# Patient Record
Sex: FEMALE | Race: BLACK OR AFRICAN AMERICAN | NOT HISPANIC OR LATINO | Employment: OTHER | URBAN - METROPOLITAN AREA
[De-identification: names, ages, dates, MRNs, and addresses within clinical notes are randomized per-mention and may not be internally consistent; named-entity substitution may affect disease eponyms.]

---

## 2017-08-17 ENCOUNTER — APPOINTMENT (OUTPATIENT)
Dept: WOMENS IMAGING | Facility: HOSPITAL | Age: 62
End: 2017-08-17

## 2017-08-17 PROCEDURE — 77063 BREAST TOMOSYNTHESIS BI: CPT | Performed by: RADIOLOGY

## 2017-08-17 PROCEDURE — 77067 SCR MAMMO BI INCL CAD: CPT | Performed by: RADIOLOGY

## 2017-08-31 ENCOUNTER — APPOINTMENT (OUTPATIENT)
Dept: WOMENS IMAGING | Facility: HOSPITAL | Age: 62
End: 2017-08-31

## 2017-08-31 PROCEDURE — G0206 DX MAMMO INCL CAD UNI: HCPCS | Performed by: RADIOLOGY

## 2017-08-31 PROCEDURE — 77061 BREAST TOMOSYNTHESIS UNI: CPT | Performed by: RADIOLOGY

## 2017-08-31 PROCEDURE — G0279 TOMOSYNTHESIS, MAMMO: HCPCS | Performed by: RADIOLOGY

## 2017-08-31 PROCEDURE — 77065 DX MAMMO INCL CAD UNI: CPT | Performed by: RADIOLOGY

## 2017-08-31 PROCEDURE — 76641 ULTRASOUND BREAST COMPLETE: CPT | Performed by: RADIOLOGY

## 2018-08-30 ENCOUNTER — APPOINTMENT (OUTPATIENT)
Dept: WOMENS IMAGING | Facility: HOSPITAL | Age: 63
End: 2018-08-30

## 2018-08-30 PROCEDURE — 77063 BREAST TOMOSYNTHESIS BI: CPT | Performed by: RADIOLOGY

## 2018-08-30 PROCEDURE — 77067 SCR MAMMO BI INCL CAD: CPT | Performed by: RADIOLOGY

## 2019-09-05 ENCOUNTER — APPOINTMENT (OUTPATIENT)
Dept: WOMENS IMAGING | Facility: HOSPITAL | Age: 64
End: 2019-09-05

## 2019-09-05 PROCEDURE — 77067 SCR MAMMO BI INCL CAD: CPT | Performed by: RADIOLOGY

## 2019-09-05 PROCEDURE — 77063 BREAST TOMOSYNTHESIS BI: CPT | Performed by: RADIOLOGY

## 2019-09-24 ENCOUNTER — APPOINTMENT (OUTPATIENT)
Dept: GENERAL RADIOLOGY | Facility: HOSPITAL | Age: 64
End: 2019-09-24

## 2019-09-24 ENCOUNTER — HOSPITAL ENCOUNTER (EMERGENCY)
Facility: HOSPITAL | Age: 64
Discharge: HOME | End: 2019-09-24
Attending: EMERGENCY MEDICINE | Admitting: EMERGENCY MEDICINE

## 2019-09-24 VITALS
RESPIRATION RATE: 18 BRPM | HEIGHT: 62 IN | TEMPERATURE: 97.4 F | DIASTOLIC BLOOD PRESSURE: 78 MMHG | HEART RATE: 65 BPM | BODY MASS INDEX: 28.34 KG/M2 | OXYGEN SATURATION: 99 % | WEIGHT: 154 LBS | SYSTOLIC BLOOD PRESSURE: 134 MMHG

## 2019-09-24 DIAGNOSIS — R07.89 ATYPICAL CHEST PAIN: Primary | ICD-10-CM

## 2019-09-24 LAB
ALBUMIN SERPL-MCNC: 4.6 G/DL (ref 3.5–5.2)
ALBUMIN/GLOB SERPL: 1.6 G/DL
ALP SERPL-CCNC: 66 U/L (ref 39–117)
ALT SERPL W P-5'-P-CCNC: 17 U/L (ref 1–33)
ANION GAP SERPL CALCULATED.3IONS-SCNC: 12.9 MMOL/L (ref 5–15)
AST SERPL-CCNC: 19 U/L (ref 1–32)
BASOPHILS # BLD AUTO: 0.07 10*3/MM3 (ref 0–0.2)
BASOPHILS NFR BLD AUTO: 0.9 % (ref 0–1.5)
BILIRUB SERPL-MCNC: 0.4 MG/DL (ref 0.2–1.2)
BUN BLD-MCNC: 12 MG/DL (ref 8–23)
BUN/CREAT SERPL: 12.5 (ref 7–25)
CALCIUM SPEC-SCNC: 9.5 MG/DL (ref 8.6–10.5)
CHLORIDE SERPL-SCNC: 98 MMOL/L (ref 98–107)
CO2 SERPL-SCNC: 25.1 MMOL/L (ref 22–29)
CREAT BLD-MCNC: 0.96 MG/DL (ref 0.57–1)
D DIMER PPP FEU-MCNC: <0.27 MCGFEU/ML (ref 0–0.49)
DEPRECATED RDW RBC AUTO: 46.3 FL (ref 37–54)
EOSINOPHIL # BLD AUTO: 0.16 10*3/MM3 (ref 0–0.4)
EOSINOPHIL NFR BLD AUTO: 2 % (ref 0.3–6.2)
ERYTHROCYTE [DISTWIDTH] IN BLOOD BY AUTOMATED COUNT: 14.6 % (ref 12.3–15.4)
GFR SERPL CREATININE-BSD FRML MDRD: 71 ML/MIN/1.73
GLOBULIN UR ELPH-MCNC: 2.8 GM/DL
GLUCOSE BLD-MCNC: 166 MG/DL (ref 65–99)
HCT VFR BLD AUTO: 37.2 % (ref 34–46.6)
HGB BLD-MCNC: 12.1 G/DL (ref 12–15.9)
IMM GRANULOCYTES # BLD AUTO: 0.03 10*3/MM3 (ref 0–0.05)
IMM GRANULOCYTES NFR BLD AUTO: 0.4 % (ref 0–0.5)
LIPASE SERPL-CCNC: 13 U/L (ref 13–60)
LYMPHOCYTES # BLD AUTO: 2.47 10*3/MM3 (ref 0.7–3.1)
LYMPHOCYTES NFR BLD AUTO: 30.5 % (ref 19.6–45.3)
MCH RBC QN AUTO: 28.5 PG (ref 26.6–33)
MCHC RBC AUTO-ENTMCNC: 32.5 G/DL (ref 31.5–35.7)
MCV RBC AUTO: 87.7 FL (ref 79–97)
MONOCYTES # BLD AUTO: 0.91 10*3/MM3 (ref 0.1–0.9)
MONOCYTES NFR BLD AUTO: 11.2 % (ref 5–12)
NEUTROPHILS # BLD AUTO: 4.46 10*3/MM3 (ref 1.7–7)
NEUTROPHILS NFR BLD AUTO: 55 % (ref 42.7–76)
NRBC BLD AUTO-RTO: 0 /100 WBC (ref 0–0.2)
PLATELET # BLD AUTO: 407 10*3/MM3 (ref 140–450)
PMV BLD AUTO: 10.3 FL (ref 6–12)
POTASSIUM BLD-SCNC: 3.7 MMOL/L (ref 3.5–5.2)
PROT SERPL-MCNC: 7.4 G/DL (ref 6–8.5)
RBC # BLD AUTO: 4.24 10*6/MM3 (ref 3.77–5.28)
SODIUM BLD-SCNC: 136 MMOL/L (ref 136–145)
TROPONIN T SERPL-MCNC: <0.01 NG/ML (ref 0–0.03)
TROPONIN T SERPL-MCNC: <0.01 NG/ML (ref 0–0.03)
WBC NRBC COR # BLD: 8.1 10*3/MM3 (ref 3.4–10.8)

## 2019-09-24 PROCEDURE — 84484 ASSAY OF TROPONIN QUANT: CPT | Performed by: EMERGENCY MEDICINE

## 2019-09-24 PROCEDURE — 99284 EMERGENCY DEPT VISIT MOD MDM: CPT

## 2019-09-24 PROCEDURE — 93005 ELECTROCARDIOGRAM TRACING: CPT

## 2019-09-24 PROCEDURE — 85379 FIBRIN DEGRADATION QUANT: CPT | Performed by: EMERGENCY MEDICINE

## 2019-09-24 PROCEDURE — 93005 ELECTROCARDIOGRAM TRACING: CPT | Performed by: EMERGENCY MEDICINE

## 2019-09-24 PROCEDURE — 80053 COMPREHEN METABOLIC PANEL: CPT | Performed by: EMERGENCY MEDICINE

## 2019-09-24 PROCEDURE — 93010 ELECTROCARDIOGRAM REPORT: CPT | Performed by: INTERNAL MEDICINE

## 2019-09-24 PROCEDURE — 83690 ASSAY OF LIPASE: CPT | Performed by: EMERGENCY MEDICINE

## 2019-09-24 PROCEDURE — 71046 X-RAY EXAM CHEST 2 VIEWS: CPT

## 2019-09-24 PROCEDURE — 85025 COMPLETE CBC W/AUTO DIFF WBC: CPT | Performed by: EMERGENCY MEDICINE

## 2019-09-24 RX ORDER — ROSUVASTATIN CALCIUM 10 MG/1
5 TABLET, COATED ORAL DAILY
COMMUNITY
End: 2019-09-24

## 2019-09-24 RX ORDER — FAMOTIDINE 20 MG/1
20 TABLET, FILM COATED ORAL 2 TIMES DAILY
Qty: 20 TABLET | Refills: 0 | Status: SHIPPED | OUTPATIENT
Start: 2019-09-24 | End: 2023-03-10

## 2019-09-24 RX ORDER — LOSARTAN POTASSIUM 100 MG/1
100 TABLET ORAL DAILY
COMMUNITY
Start: 2018-12-29

## 2019-09-24 RX ORDER — LEVOTHYROXINE SODIUM 0.07 MG/1
75 TABLET ORAL DAILY
COMMUNITY

## 2019-09-24 RX ORDER — ATORVASTATIN CALCIUM 20 MG/1
20 TABLET, FILM COATED ORAL DAILY
COMMUNITY

## 2019-09-24 RX ORDER — MELOXICAM 15 MG/1
15 TABLET ORAL DAILY
COMMUNITY
Start: 2014-03-24

## 2019-09-24 RX ORDER — SODIUM CHLORIDE 0.9 % (FLUSH) 0.9 %
10 SYRINGE (ML) INJECTION AS NEEDED
Status: DISCONTINUED | OUTPATIENT
Start: 2019-09-24 | End: 2019-09-24 | Stop reason: HOSPADM

## 2019-09-24 NOTE — ED NOTES
Pt arrives with c/o CP that started yesterday. Denies SOA. Pt appears in NAD.       Alexa Kessler RN  09/24/19 4970

## 2019-09-24 NOTE — ED PROVIDER NOTES
" EMERGENCY DEPARTMENT ENCOUNTER    CHIEF COMPLAINT  Chief Complaint: right sided chest pain  History given by: patient  History limited by: none  Room Number: 15/15  PMD: Tushar Her III, MD      HPI:  Pt is a 64 y.o. female who presents complaining of intermittent right sided chest \"tightness\" lasting around 30 minutes that started a few days ago and  occurrs at rest. Pt states the pain radiates into the neck and back but is not worsened with exertion. Pt confirms SOA, more than baseline, but denies nausea, vomiting, and diaphoresis. Pt has a hx of high cholesterol and HTN with a family hx of heart disease (father  at 51). Pt denies hx of MI, stents and smoking.  Thinks it is related to eating a certain food last night.        Duration:  A few days  Timing: intermittent  Location: right sided chest  Radiation: neck and back  Quality: \"tightness\"  Intensity/Severity: moderate  Associated Symptoms: SOA      PAST MEDICAL HISTORY  Active Ambulatory Problems     Diagnosis Date Noted   • No Active Ambulatory Problems     Resolved Ambulatory Problems     Diagnosis Date Noted   • No Resolved Ambulatory Problems     Past Medical History:   Diagnosis Date   • Disease of thyroid gland    • Hyperlipidemia    • Hypertension        PAST SURGICAL HISTORY  History reviewed. No pertinent surgical history.    FAMILY HISTORY  History reviewed. No pertinent family history.    SOCIAL HISTORY  Social History     Socioeconomic History   • Marital status: Unknown     Spouse name: Not on file   • Number of children: Not on file   • Years of education: Not on file   • Highest education level: Not on file       ALLERGIES  Patient has no known allergies.    REVIEW OF SYSTEMS  Review of Systems   Constitutional: Negative for diaphoresis and fever.   HENT: Negative for sore throat.    Eyes: Negative.    Respiratory: Positive for shortness of breath. Negative for cough.    Cardiovascular: Positive for chest pain (\"tightness\"). "   Gastrointestinal: Negative for abdominal pain, diarrhea, nausea and vomiting.   Genitourinary: Negative for dysuria.   Musculoskeletal: Positive for back pain and neck pain.   Skin: Negative for rash.   Allergic/Immunologic: Negative.    Neurological: Negative for weakness, numbness and headaches.   Hematological: Negative.    Psychiatric/Behavioral: Negative.    All other systems reviewed and are negative.      PHYSICAL EXAM  ED Triage Vitals [09/24/19 0723]   Temp Heart Rate Resp BP SpO2   97.4 °F (36.3 °C) 93 18 -- 99 %      Temp src Heart Rate Source Patient Position BP Location FiO2 (%)   Tympanic -- -- -- --       Physical Exam   Constitutional: She is oriented to person, place, and time. No distress.   HENT:   Head: Normocephalic and atraumatic.   Eyes: EOM are normal. Pupils are equal, round, and reactive to light.   Neck: Normal range of motion. Neck supple.   Cardiovascular: Normal rate, regular rhythm and normal heart sounds.   Pulmonary/Chest: Effort normal and breath sounds normal. No respiratory distress.   Abdominal: Soft. There is no tenderness. There is no rebound and no guarding.   Musculoskeletal: Normal range of motion. She exhibits no edema.   Neurological: She is alert and oriented to person, place, and time. She has normal sensation and normal strength.   Skin: Skin is warm and dry. No rash noted.   Psychiatric: Mood and affect normal.   Nursing note and vitals reviewed.      LAB RESULTS  Lab Results (last 24 hours)     Procedure Component Value Units Date/Time    CBC & Differential [[id number]] Collected:  09/24/19 0757    Specimen:  Blood Updated:  09/24/19 0809    Narrative:       The following orders were created for panel order CBC & Differential.  Procedure                               Abnormality         Status                     ---------                               -----------         ------                     CBC Auto Differential[[id number]]        Abnormal            Final  result                 Please view results for these tests on the individual orders.    Comprehensive Metabolic Panel [[id number]]  (Abnormal) Collected:  09/24/19 0757    Specimen:  Blood Updated:  09/24/19 0827     Glucose 166 mg/dL      BUN 12 mg/dL      Creatinine 0.96 mg/dL      Sodium 136 mmol/L      Potassium 3.7 mmol/L      Chloride 98 mmol/L      CO2 25.1 mmol/L      Calcium 9.5 mg/dL      Total Protein 7.4 g/dL      Albumin 4.60 g/dL      ALT (SGPT) 17 U/L      AST (SGOT) 19 U/L      Alkaline Phosphatase 66 U/L      Total Bilirubin 0.4 mg/dL      eGFR  African Amer 71 mL/min/1.73      Globulin 2.8 gm/dL      A/G Ratio 1.6 g/dL      BUN/Creatinine Ratio 12.5     Anion Gap 12.9 mmol/L     Narrative:       GFR Normal >60  Chronic Kidney Disease <60  Kidney Failure <15    Lipase [[id number]]  (Normal) Collected:  09/24/19 0757    Specimen:  Blood Updated:  09/24/19 0827     Lipase 13 U/L     Troponin [[id number]]  (Normal) Collected:  09/24/19 0757    Specimen:  Blood Updated:  09/24/19 0827     Troponin T <0.010 ng/mL     Narrative:       Troponin T Reference Range:  <= 0.03 ng/mL-   Negative for AMI  >0.03 ng/mL-     Abnormal for myocardial necrosis.  Clinicians would have to utilize clinical acumen, EKG, Troponin and serial changes to determine if it is an Acute Myocardial Infarction or myocardial injury due to an underlying chronic condition.     D-dimer, Quantitative [[id number]]  (Normal) Collected:  09/24/19 0757    Specimen:  Blood Updated:  09/24/19 0838     D-Dimer, Quantitative <0.27 MCGFEU/mL     Narrative:       The Stago D-Dimer test used in conjunction with a clinical pretest probability (PTP) assessment model, has been approved by the FDA to rule out the presence of venous thromboembolism (VTE) in outpatients suspected of deep venous thrombosis (DVT) or pulmonary embolism (PE). The cut-off for negative predictive value is <0.50 MCGFEU/mL.    CBC Auto Differential [[id number]]  (Abnormal) Collected:   09/24/19 0757    Specimen:  Blood Updated:  09/24/19 0809     WBC 8.10 10*3/mm3      RBC 4.24 10*6/mm3      Hemoglobin 12.1 g/dL      Hematocrit 37.2 %      MCV 87.7 fL      MCH 28.5 pg      MCHC 32.5 g/dL      RDW 14.6 %      RDW-SD 46.3 fl      MPV 10.3 fL      Platelets 407 10*3/mm3      Neutrophil % 55.0 %      Lymphocyte % 30.5 %      Monocyte % 11.2 %      Eosinophil % 2.0 %      Basophil % 0.9 %      Immature Grans % 0.4 %      Neutrophils, Absolute 4.46 10*3/mm3      Lymphocytes, Absolute 2.47 10*3/mm3      Monocytes, Absolute 0.91 10*3/mm3      Eosinophils, Absolute 0.16 10*3/mm3      Basophils, Absolute 0.07 10*3/mm3      Immature Grans, Absolute 0.03 10*3/mm3      nRBC 0.0 /100 WBC     Troponin [[id number]]  (Normal) Collected:  09/24/19 1018    Specimen:  Blood Updated:  09/24/19 1049     Troponin T <0.010 ng/mL     Narrative:       Troponin T Reference Range:  <= 0.03 ng/mL-   Negative for AMI  >0.03 ng/mL-     Abnormal for myocardial necrosis.  Clinicians would have to utilize clinical acumen, EKG, Troponin and serial changes to determine if it is an Acute Myocardial Infarction or myocardial injury due to an underlying chronic condition.           I ordered the above labs and reviewed the results    RADIOLOGY  XR Chest 2 View   Preliminary Result   1. No active disease is seen in the chest.   2. On the lateral view of the chest there is an isolated area of disc   space narrowing and some endplate irregularity and sclerosis in mid   thoracic spine approximately T7-8 that is probably degenerative in   origin, correlate clinically. Results were communicated to Dr. Graham   in the emergency room by telephone 09/24/2019 at 8:25 AM.                I ordered the above noted radiological studies. Interpreted by radiologist. Discussed with radiologist (Dr. Vaughn). Reviewed by me in PACS.       PROCEDURES  Procedures    EKG          EKG time: 0725  Rhythm/Rate: sinus bradycardia 55  P waves and TX:  normal  QRS, axis: LVH   ST and T waves: flattened T waves diffusely     Interpreted Contemporaneously by me, independently viewed  No prior to compare    PROGRESS AND CONSULTS  ED Course as of Sep 24 1124   Tue Sep 24, 2019   1052 10:53 AM  Patient here for chest pain.  I don't think that it is cardiac.  Pain is not exertional.  Right sided.  Serial troponins are normal.  Discussed with Dr. Kapadia who would like outpatient treatment.  She understands to return for any concerns.    [SL]      ED Course User Index  [SL] Eric Graham MD     0722 ordered EKG for further evaluation    0743 ordered CXR, D-dimer and labs for further evaluation.     0829 consult with Dr. Vaughn, radiologist, who states pt has degenerative disease in spine on CXR but nothing acute.    0839 pt rechecked and resting comfortably. Informed pt of negative labs and normal imaging and plan to consult with pt's heart specialist.    0841 placed call to [facility]    0843 discussed pt case with Dr. Kapadia, [facility], who recommends a second troponin. If normal, have pt follow up as outpatient.    0844 pt rechecked and resting comfortably. Informed pt of plan to recheck troponin. Pt understands and agrees with the plan. All questions have been answered.    0845 ordered second troponin for further evaluation    1052 informed pt of normal second troponin and plan to discharge and follow up with [facility]. Pt understands and agrees with the plan. All questions have been answered.      MEDICAL DECISION MAKING  Results were reviewed/discussed with the patient and they were also made aware of online access. Pt also made aware that some labs, such as cultures, will not be resulted during ER visit and follow up with PMD is necessary.     MDM  Number of Diagnoses or Management Options     Amount and/or Complexity of Data Reviewed  Clinical lab tests: ordered and reviewed (  Trop (1)negative  Creatinine 0.96  D-dimer <0.27  Trop (2) negative)  Tests in the radiology section of  CPT®: ordered and reviewed (CXR- degenerative disease in spine but nothing acute)  Tests in the medicine section of CPT®: ordered and reviewed (See procedure note for EKG results)  Discussion of test results with the performing providers: yes (Dr. Vaughn)  Decide to obtain previous medical records or to obtain history from someone other than the patient: yes  Review and summarize past medical records: yes (No hx in epic)  Discuss the patient with other providers: yes (Dr. Kapadia, [facility])  Independent visualization of images, tracings, or specimens: yes           DIAGNOSIS  Final diagnoses:   Atypical chest pain       DISPOSITION  DISCHARGE    Patient discharged in stable condition.    Reviewed implications of results, diagnosis, meds, responsibility to follow up, warning signs and symptoms of possible worsening, potential complications and reasons to return to ER, including new or worsening sx.    Patient/Family voiced understanding of above instructions.    Discussed plan for discharge, as there is no emergent indication for admission. Patient referred to primary care provider for BP management due to today's BP. Pt/family is agreeable and understands need for follow up and repeat testing.  Pt is aware that discharge does not mean that nothing is wrong but it indicates no emergency is present that requires admission and they must continue care with follow-up as given below or physician of their choice.     FOLLOW-UP  Monica Patel MD  [address] [id number] [phone]    Call today           Medication List      New Prescriptions    famotidine 20 MG tablet  Commonly known as:  PEPCID  Take 1 tablet by mouth 2 (Two) Times a Day.              Latest Documented Vital Signs:  As of 11:24 AM  BP- 134/78 HR- 65 Temp- 97.4 °F (36.3 °C) (Tympanic) O2 sat- 99%    --  Documentation assistance provided by catia Barboza for Dr. Graham.  Information recorded by the catia was done at  my direction and has been verified and validated by me.             Cindy Barboza  09/24/19 1055       Eric Graham MD  09/24/19 1123

## 2021-02-03 ENCOUNTER — APPOINTMENT (OUTPATIENT)
Dept: WOMENS IMAGING | Facility: HOSPITAL | Age: 66
End: 2021-02-03

## 2021-02-03 PROCEDURE — 77067 SCR MAMMO BI INCL CAD: CPT | Performed by: RADIOLOGY

## 2021-02-03 PROCEDURE — 77063 BREAST TOMOSYNTHESIS BI: CPT | Performed by: RADIOLOGY

## 2022-02-18 ENCOUNTER — APPOINTMENT (OUTPATIENT)
Dept: WOMENS IMAGING | Facility: HOSPITAL | Age: 67
End: 2022-02-18

## 2022-02-18 PROCEDURE — 77067 SCR MAMMO BI INCL CAD: CPT | Performed by: RADIOLOGY

## 2022-02-18 PROCEDURE — 77063 BREAST TOMOSYNTHESIS BI: CPT | Performed by: RADIOLOGY

## 2023-03-10 ENCOUNTER — HOSPITAL ENCOUNTER (OUTPATIENT)
Facility: HOSPITAL | Age: 68
Discharge: HOME | End: 2023-03-10
Attending: EMERGENCY MEDICINE | Admitting: INTERNAL MEDICINE
Payer: MEDICARE

## 2023-03-10 ENCOUNTER — APPOINTMENT (OUTPATIENT)
Dept: GENERAL RADIOLOGY | Facility: HOSPITAL | Age: 68
End: 2023-03-10
Payer: MEDICARE

## 2023-03-10 VITALS
BODY MASS INDEX: 28.36 KG/M2 | HEIGHT: 62 IN | HEART RATE: 60 BPM | RESPIRATION RATE: 20 BRPM | DIASTOLIC BLOOD PRESSURE: 85 MMHG | OXYGEN SATURATION: 98 % | TEMPERATURE: 97.9 F | SYSTOLIC BLOOD PRESSURE: 165 MMHG | WEIGHT: 154.1 LBS

## 2023-03-10 DIAGNOSIS — E78.00 HYPERCHOLESTEROLEMIA: ICD-10-CM

## 2023-03-10 DIAGNOSIS — R07.9 CHEST PAIN, UNSPECIFIED TYPE: Primary | ICD-10-CM

## 2023-03-10 DIAGNOSIS — Z82.49 FAMILY HISTORY OF CORONARY ARTERY DISEASE: ICD-10-CM

## 2023-03-10 DIAGNOSIS — I10 HYPERTENSION, UNSPECIFIED TYPE: ICD-10-CM

## 2023-03-10 PROBLEM — M47.816 LUMBAR SPONDYLOSIS: Status: ACTIVE | Noted: 2019-01-30

## 2023-03-10 PROBLEM — E03.9 HYPOTHYROIDISM: Status: ACTIVE | Noted: 2023-03-10

## 2023-03-10 PROBLEM — Z86.39 HISTORY OF GRAVES' DISEASE: Status: ACTIVE | Noted: 2021-02-17

## 2023-03-10 PROBLEM — M81.0 OSTEOPOROSIS: Status: ACTIVE | Noted: 2021-02-17

## 2023-03-10 PROBLEM — I34.0 MITRAL VALVE REGURGITATION: Status: ACTIVE | Noted: 2017-01-17

## 2023-03-10 PROBLEM — R73.09 ELEVATED HEMOGLOBIN A1C: Status: ACTIVE | Noted: 2021-02-17

## 2023-03-10 PROBLEM — R09.89 CAROTID BRUIT: Status: ACTIVE | Noted: 2022-09-09

## 2023-03-10 LAB
ALBUMIN SERPL-MCNC: 4.5 G/DL (ref 3.5–5.2)
ALBUMIN/GLOB SERPL: 1.7 G/DL
ALP SERPL-CCNC: 64 U/L (ref 39–117)
ALT SERPL W P-5'-P-CCNC: 10 U/L (ref 1–33)
ANION GAP SERPL CALCULATED.3IONS-SCNC: 10.4 MMOL/L (ref 5–15)
AST SERPL-CCNC: 17 U/L (ref 1–32)
BASOPHILS # BLD AUTO: 0.06 10*3/MM3 (ref 0–0.2)
BASOPHILS NFR BLD AUTO: 0.6 % (ref 0–1.5)
BILIRUB SERPL-MCNC: 0.2 MG/DL (ref 0–1.2)
BUN SERPL-MCNC: 18 MG/DL (ref 8–23)
BUN/CREAT SERPL: 19.8 (ref 7–25)
CALCIUM SPEC-SCNC: 9.7 MG/DL (ref 8.6–10.5)
CHLORIDE SERPL-SCNC: 104 MMOL/L (ref 98–107)
CHOLEST SERPL-MCNC: 160 MG/DL (ref 0–200)
CO2 SERPL-SCNC: 26.6 MMOL/L (ref 22–29)
CREAT SERPL-MCNC: 0.91 MG/DL (ref 0.57–1)
DEPRECATED RDW RBC AUTO: 47.8 FL (ref 37–54)
EGFRCR SERPLBLD CKD-EPI 2021: 68.9 ML/MIN/1.73
EOSINOPHIL # BLD AUTO: 0.12 10*3/MM3 (ref 0–0.4)
EOSINOPHIL NFR BLD AUTO: 1.2 % (ref 0.3–6.2)
ERYTHROCYTE [DISTWIDTH] IN BLOOD BY AUTOMATED COUNT: 14.9 % (ref 12.3–15.4)
GEN 5 2HR TROPONIN T REFLEX: 11 NG/L
GLOBULIN UR ELPH-MCNC: 2.6 GM/DL
GLUCOSE SERPL-MCNC: 153 MG/DL (ref 65–99)
HCT VFR BLD AUTO: 33.7 % (ref 34–46.6)
HDLC SERPL-MCNC: 49 MG/DL (ref 40–60)
HGB BLD-MCNC: 11.1 G/DL (ref 12–15.9)
HOLD SPECIMEN: NORMAL
HOLD SPECIMEN: NORMAL
IMM GRANULOCYTES # BLD AUTO: 0.02 10*3/MM3 (ref 0–0.05)
IMM GRANULOCYTES NFR BLD AUTO: 0.2 % (ref 0–0.5)
INR PPP: 0.91 (ref 0.9–1.1)
LDLC SERPL CALC-MCNC: 95 MG/DL (ref 0–100)
LDLC/HDLC SERPL: 1.91 {RATIO}
LIPASE SERPL-CCNC: 40 U/L (ref 13–60)
LYMPHOCYTES # BLD AUTO: 3.31 10*3/MM3 (ref 0.7–3.1)
LYMPHOCYTES NFR BLD AUTO: 33.4 % (ref 19.6–45.3)
MCH RBC QN AUTO: 29.3 PG (ref 26.6–33)
MCHC RBC AUTO-ENTMCNC: 32.9 G/DL (ref 31.5–35.7)
MCV RBC AUTO: 88.9 FL (ref 79–97)
MONOCYTES # BLD AUTO: 0.9 10*3/MM3 (ref 0.1–0.9)
MONOCYTES NFR BLD AUTO: 9.1 % (ref 5–12)
NEUTROPHILS NFR BLD AUTO: 5.51 10*3/MM3 (ref 1.7–7)
NEUTROPHILS NFR BLD AUTO: 55.5 % (ref 42.7–76)
NRBC BLD AUTO-RTO: 0 /100 WBC (ref 0–0.2)
PLATELET # BLD AUTO: 420 10*3/MM3 (ref 140–450)
PMV BLD AUTO: 9.9 FL (ref 6–12)
POTASSIUM SERPL-SCNC: 3.5 MMOL/L (ref 3.5–5.2)
PROT SERPL-MCNC: 7.1 G/DL (ref 6–8.5)
PROTHROMBIN TIME: 12.3 SECONDS (ref 11.7–14.2)
QT INTERVAL: 463 MS
RBC # BLD AUTO: 3.79 10*6/MM3 (ref 3.77–5.28)
SODIUM SERPL-SCNC: 141 MMOL/L (ref 136–145)
TRIGL SERPL-MCNC: 88 MG/DL (ref 0–150)
TROPONIN T DELTA: 0 NG/L
TROPONIN T SERPL HS-MCNC: 11 NG/L
VLDLC SERPL-MCNC: 16 MG/DL (ref 5–40)
WBC NRBC COR # BLD: 9.92 10*3/MM3 (ref 3.4–10.8)
WHOLE BLOOD HOLD COAG: NORMAL
WHOLE BLOOD HOLD SPECIMEN: NORMAL

## 2023-03-10 PROCEDURE — 83690 ASSAY OF LIPASE: CPT | Performed by: EMERGENCY MEDICINE

## 2023-03-10 PROCEDURE — 85610 PROTHROMBIN TIME: CPT | Performed by: EMERGENCY MEDICINE

## 2023-03-10 PROCEDURE — 25010000002 HEPARIN (PORCINE) PER 1000 UNITS: Performed by: INTERNAL MEDICINE

## 2023-03-10 PROCEDURE — 84484 ASSAY OF TROPONIN QUANT: CPT | Performed by: EMERGENCY MEDICINE

## 2023-03-10 PROCEDURE — G0378 HOSPITAL OBSERVATION PER HR: HCPCS

## 2023-03-10 PROCEDURE — 80061 LIPID PANEL: CPT | Performed by: NURSE PRACTITIONER

## 2023-03-10 PROCEDURE — 99152 MOD SED SAME PHYS/QHP 5/>YRS: CPT | Performed by: INTERNAL MEDICINE

## 2023-03-10 PROCEDURE — 25010000002 FENTANYL CITRATE (PF) 50 MCG/ML SOLUTION: Performed by: INTERNAL MEDICINE

## 2023-03-10 PROCEDURE — 80053 COMPREHEN METABOLIC PANEL: CPT | Performed by: EMERGENCY MEDICINE

## 2023-03-10 PROCEDURE — 99285 EMERGENCY DEPT VISIT HI MDM: CPT

## 2023-03-10 PROCEDURE — 93005 ELECTROCARDIOGRAM TRACING: CPT | Performed by: EMERGENCY MEDICINE

## 2023-03-10 PROCEDURE — C1894 INTRO/SHEATH, NON-LASER: HCPCS | Performed by: INTERNAL MEDICINE

## 2023-03-10 PROCEDURE — 25010000002 MIDAZOLAM PER 1 MG: Performed by: INTERNAL MEDICINE

## 2023-03-10 PROCEDURE — 85025 COMPLETE CBC W/AUTO DIFF WBC: CPT | Performed by: EMERGENCY MEDICINE

## 2023-03-10 PROCEDURE — C1769 GUIDE WIRE: HCPCS | Performed by: INTERNAL MEDICINE

## 2023-03-10 PROCEDURE — 93458 L HRT ARTERY/VENTRICLE ANGIO: CPT | Performed by: INTERNAL MEDICINE

## 2023-03-10 PROCEDURE — 71045 X-RAY EXAM CHEST 1 VIEW: CPT

## 2023-03-10 PROCEDURE — 25510000001 IOPAMIDOL PER 1 ML: Performed by: INTERNAL MEDICINE

## 2023-03-10 PROCEDURE — 93010 ELECTROCARDIOGRAM REPORT: CPT | Performed by: INTERNAL MEDICINE

## 2023-03-10 RX ORDER — GABAPENTIN 300 MG/1
1 CAPSULE ORAL 3 TIMES DAILY PRN
COMMUNITY
Start: 2023-02-22

## 2023-03-10 RX ORDER — AMLODIPINE BESYLATE 5 MG/1
5 TABLET ORAL DAILY
COMMUNITY

## 2023-03-10 RX ORDER — ASPIRIN 325 MG
325 TABLET ORAL ONCE
Status: COMPLETED | OUTPATIENT
Start: 2023-03-10 | End: 2023-03-10

## 2023-03-10 RX ORDER — TRIAMTERENE AND HYDROCHLOROTHIAZIDE 75; 50 MG/1; MG/1
1 TABLET ORAL DAILY
COMMUNITY
Start: 2023-01-22

## 2023-03-10 RX ORDER — LIDOCAINE HYDROCHLORIDE 20 MG/ML
15 SOLUTION OROPHARYNGEAL ONCE
Status: COMPLETED | OUTPATIENT
Start: 2023-03-10 | End: 2023-03-10

## 2023-03-10 RX ORDER — FENTANYL CITRATE 50 UG/ML
INJECTION, SOLUTION INTRAMUSCULAR; INTRAVENOUS
Status: DISCONTINUED | OUTPATIENT
Start: 2023-03-10 | End: 2023-03-10 | Stop reason: HOSPADM

## 2023-03-10 RX ORDER — LOSARTAN POTASSIUM 50 MG/1
100 TABLET ORAL DAILY
Status: CANCELLED | OUTPATIENT
Start: 2023-03-10

## 2023-03-10 RX ORDER — LIDOCAINE HYDROCHLORIDE 20 MG/ML
INJECTION, SOLUTION INFILTRATION; PERINEURAL
Status: DISCONTINUED | OUTPATIENT
Start: 2023-03-10 | End: 2023-03-10 | Stop reason: HOSPADM

## 2023-03-10 RX ORDER — AMLODIPINE BESYLATE 5 MG/1
5 TABLET ORAL DAILY
Status: CANCELLED | OUTPATIENT
Start: 2023-03-10

## 2023-03-10 RX ORDER — LEVOTHYROXINE SODIUM 0.07 MG/1
75 TABLET ORAL DAILY
Status: CANCELLED | OUTPATIENT
Start: 2023-03-10

## 2023-03-10 RX ORDER — ASPIRIN 325 MG
325 TABLET ORAL DAILY
Status: CANCELLED | OUTPATIENT
Start: 2023-03-11

## 2023-03-10 RX ORDER — MELOXICAM 15 MG/1
15 TABLET ORAL DAILY
Status: CANCELLED | OUTPATIENT
Start: 2023-03-10

## 2023-03-10 RX ORDER — ALUMINA, MAGNESIA, AND SIMETHICONE 2400; 2400; 240 MG/30ML; MG/30ML; MG/30ML
15 SUSPENSION ORAL ONCE
Status: COMPLETED | OUTPATIENT
Start: 2023-03-10 | End: 2023-03-10

## 2023-03-10 RX ORDER — ATORVASTATIN CALCIUM 20 MG/1
20 TABLET, FILM COATED ORAL DAILY
Status: CANCELLED | OUTPATIENT
Start: 2023-03-10

## 2023-03-10 RX ORDER — ONDANSETRON 2 MG/ML
4 INJECTION INTRAMUSCULAR; INTRAVENOUS EVERY 6 HOURS PRN
Status: DISCONTINUED | OUTPATIENT
Start: 2023-03-10 | End: 2023-03-10 | Stop reason: HOSPADM

## 2023-03-10 RX ORDER — SODIUM CHLORIDE 9 MG/ML
INJECTION, SOLUTION INTRAVENOUS
Status: COMPLETED | OUTPATIENT
Start: 2023-03-10 | End: 2023-03-10

## 2023-03-10 RX ORDER — TRIAMTERENE AND HYDROCHLOROTHIAZIDE 75; 50 MG/1; MG/1
1 TABLET ORAL DAILY
Status: CANCELLED | OUTPATIENT
Start: 2023-03-11

## 2023-03-10 RX ORDER — NITROGLYCERIN 0.4 MG/1
0.4 TABLET SUBLINGUAL
COMMUNITY
Start: 2022-09-26

## 2023-03-10 RX ORDER — CYCLOBENZAPRINE HCL 10 MG
10 TABLET ORAL 3 TIMES DAILY PRN
COMMUNITY
Start: 2023-02-21

## 2023-03-10 RX ORDER — VERAPAMIL HYDROCHLORIDE 2.5 MG/ML
INJECTION, SOLUTION INTRAVENOUS
Status: DISCONTINUED | OUTPATIENT
Start: 2023-03-10 | End: 2023-03-10 | Stop reason: HOSPADM

## 2023-03-10 RX ORDER — CYCLOBENZAPRINE HCL 10 MG
10 TABLET ORAL 3 TIMES DAILY PRN
Status: CANCELLED | OUTPATIENT
Start: 2023-03-10

## 2023-03-10 RX ORDER — SODIUM CHLORIDE 0.9 % (FLUSH) 0.9 %
10 SYRINGE (ML) INJECTION AS NEEDED
Status: DISCONTINUED | OUTPATIENT
Start: 2023-03-10 | End: 2023-03-10 | Stop reason: HOSPADM

## 2023-03-10 RX ORDER — ACETAMINOPHEN 325 MG/1
650 TABLET ORAL EVERY 4 HOURS PRN
Status: DISCONTINUED | OUTPATIENT
Start: 2023-03-10 | End: 2023-03-10 | Stop reason: HOSPADM

## 2023-03-10 RX ORDER — HEPARIN SODIUM 1000 [USP'U]/ML
INJECTION, SOLUTION INTRAVENOUS; SUBCUTANEOUS
Status: DISCONTINUED | OUTPATIENT
Start: 2023-03-10 | End: 2023-03-10 | Stop reason: HOSPADM

## 2023-03-10 RX ORDER — MIDAZOLAM HYDROCHLORIDE 1 MG/ML
INJECTION INTRAMUSCULAR; INTRAVENOUS
Status: DISCONTINUED | OUTPATIENT
Start: 2023-03-10 | End: 2023-03-10 | Stop reason: HOSPADM

## 2023-03-10 RX ADMIN — ALUMINUM HYDROXIDE, MAGNESIUM HYDROXIDE, AND DIMETHICONE 15 ML: 400; 400; 40 SUSPENSION ORAL at 11:20

## 2023-03-10 RX ADMIN — NITROGLYCERIN 1 INCH: 20 OINTMENT TOPICAL at 09:42

## 2023-03-10 RX ADMIN — ASPIRIN 325 MG: 325 TABLET ORAL at 09:43

## 2023-03-10 RX ADMIN — LIDOCAINE HYDROCHLORIDE 15 ML: 20 SOLUTION ORAL; TOPICAL at 11:20

## 2023-03-10 NOTE — DISCHARGE INSTRUCTIONS
[facility]  [address] [place] [address]    Coronary Angiogram (Radial/Ulnar Approach) After Care    Refer to this sheet in the next few weeks. These instructions provide you with information on caring for yourself after your procedure. Your caregiver may also give you more specific instructions. Your treatment has been planned according to current medical practices, but problems sometimes occur. Call your caregiver if you have any problems or questions after your procedure.    Home Care Instructions:  You may shower the day after the procedure. Remove the bandage (dressing) and gently wash the site with plain soap and water. Gently pat the site dry. You may apply a band aid daily for 2 days if desired.    Do not apply powder or lotion to the site.  Do not submerge the affected site in water for 3 to 5 days or until the site is completely healed.   Do not lift, push or pull anything over 5 pounds for 5 days after your procedure or as directed by your physician.  As a reference, a gallon of milk weighs 8 pounds.   Inspect the site at least twice daily. You may notice some bruising at the site and it may be tender for 1 to 2 weeks.     Increase your fluid intake for the next 2 days.    Keep arm elevated for 24 hours. For the remainder of the day, keep your arm in “Pledge of Allegiance” position when up and about.     You may drive 24 hours after the procedure unless otherwise instructed by your caregiver.  Do not operate machinery or power tools for 24 hours.  A responsible adult should be with you for the first 24 hours after you arrive home. Do not make any important legal decisions or sign legal papers for 24 hours.  Do not drink alcohol for 24 hours.    Metformin or any medications containing Metformin should not be taken for 48 hours after your procedure.      Call Your Doctor if:   You have unusual pain at the radial/ulnar (wrist) site.  You have redness, warmth, swelling, or pain at the  radial/ulnar (wrist) site.  You have drainage (other than a small amount of blood on the dressing).  `You have chills or a fever > 101.  Your arm becomes pale or dark, cool, tingly, or numb.  You develop chest pain, shortness of breath, feel faint or pass out.    You have heavy bleeding from the site, hold pressure on the site for 20 minutes.  If the bleeding stops, apply a fresh bandage and call your cardiologist.  However, if you        continue to have bleeding, call 911 and continue to apply pressure to the site.   You have any symptoms of a stroke.  Remember BE FAST  B-balance. Sudden trouble walking or loss of balance.  E-eyes.  Sudden changes in how you see or a sudden onset of a very bad headache.   F-face. Sudden weakness or loss of feeling of the face or facial droop on one side.   A-arms Sudden weakness or numbness in one arm.  One arm drifts down if they are both held out in front of you. This happens suddenly and usually on one side of the body.   S-speech.  Sudden trouble speaking, slurred speech or trouble understanding what are saying.   T-time  Time to call emergency services.  Write down the symptoms and the time they started.

## 2023-03-10 NOTE — PROCEDURES
:   Ruth Cochran MD    Procedures performed:  1.  Left heart catheterization.  2.  Left ventriculography    3.  Selective native coronary angiography    Indications:  ACS    Description of the procedure:  Patient was brought to the cardiac catheter was explained the risk and benefit and alternatives of the procedure. Patient signed informed consent, was prepped and draped in sterile fashion for right radial artery access.  Using micropuncture needle and modified Seldinger technique a 6 Greek sheath was advanced into the right radial artery.  5 Fr JL3.5 and JR4 catheter was used to engage the left and right coronary artery respectively.  A pigtail catheter was used to cross the aortic valve and perform a left heart catheterization and LV gram.  Moderate sedation duration 15 minutes.    All exchanges were done over the wire.    Hemostasis was achieved by manual compression / TR band.     No complications noted.    Findings:  1.  Left heart catheterization: LVEDP 6  mmHg . No gradient across AV valve    2.  Left ventriculography:  LV function normal, 55-60%.     3.  Selective native coronary angiography:  A. Left main bifurcates into LAD and left circumflex.  No angiographic disease noted in left main.  B. LAD: Large vessel wraps around the apex.  Gives rise to small to medium caliber diagonal branches.  Ostial LAD with 20% stenosis.  Mid LAD with 20% stenosis.  C. Left circumflex: Large caliber vessel.  No angiographic disease noted in circumflex and its branches.  D. Right coronary artery: Large-caliber dominant vessel.  Distal right coronary artery with 30% stenosis.      Conclusion:  1.  Mild nonobstructive coronary artery disease.  2.  Preserved LVEF and LVEDP.      Recommendations:  Aggressive risk factor modification.  Medical management.

## 2023-03-10 NOTE — PLAN OF CARE
Goal Outcome Evaluation:  Pt admitted from ER @ 1145   Data base completed  Orders for cardiac cath noted and permit signed  Pt sent to cath lab@ 1215  Back from cath lab@0914  TR band removed without difficulty  Pt discharged to home after instructions   Safety maintained

## 2023-03-10 NOTE — H&P
Patient Care Team:  Provider, No Known as PCP - Romina Moffett MD as Consulting Physician (Obstetrics and Gynecology)    Chief complaint: midsternal chest pain relieved with SL nitro.    Subjective sitting up in bed    HPI;  Patient is a 67 y.o. female who follows Dr. Patel in office. She presents with mid sternal chest tightness.     PMH: Carotid stenosis R>L- follows Dr. Carreon, HLD, HTN, reformed smoker, mild-moderate MR  She tells me that she was in her usual state of health until yesterday.  She states she went to bed and was awaken with chest tightness that radiates to center of back, up neck to jaws. Pain is associated with SOA, nausea and diaphoresis. She states she experienced 3 episodes yesterday each lasting from 5-20 mins. She states she took SL nitro each time and the pain would subside. After the third episode she decided that she would come to ED to be evaluated.     ED Evaluation:  HS trop: 11-->11  CXR: There is mild cardiomegaly unchanged from 09/24/2019. No acute abnormality is seen.  EKG:       History    Past Medical History:   Diagnosis Date   • Carotid stenosis, asymptomatic 11/2022    JAYLENE 50-69%, LICA < 50%   • Disease of thyroid gland    • Hyperlipidemia    • Hypertension    • Mitral valve regurgitation     mild-moderate     No past surgical history on file.  (Not in a hospital admission)    Social History     Socioeconomic History   • Marital status: Single   Tobacco Use   • Smoking status: Former     Types: Cigarettes   • Smokeless tobacco: Never   Vaping Use   • Vaping Use: Never used   Substance and Sexual Activity   • Alcohol use: Never   • Drug use: Yes     Types: Marijuana   • Sexual activity: Defer      Family History   Problem Relation Age of Onset   • Coronary artery disease Father         Objective   Review of Systems  Constitutional: Negative for diaphoresis, fatigue, fever and unexpected weight change.   HENT: Negative.    Eyes: Negative.    Respiratory:  Negative for cough, shortness of breath and wheezing.    Cardiovascular: + chest pain, palpitations and leg swelling.   Gastrointestinal: Negative for abdominal pain, blood in stool, constipation, diarrhea, + nausea no vomitting  Endocrine: Negative.    Genitourinary: Negative for difficulty urinating, dysuria and frequency.   Musculoskeletal: back pain  Skin: Negative.    Allergic/Immunologic: Negative for environmental allergies and food allergies.   Neurological: Negative.    Hematological: Negative.    Psychiatric/Behavioral: Negative.      Physical Exam:  Vital Signs  Temp:  [97.6 °F (36.4 °C)] 97.6 °F (36.4 °C)  Heart Rate:  [53-71] 56  Resp:  [16] 16  BP: (131-190)/(77-83) 147/83  Constitutional: oriented to person, place, and time. well-developed and well-nourished.   HENT:  Normocephalic and atraumatic.  Conjunctivae and EOM are normal. Pupils are equal, round, and reactive to light. Normal range of motion. Neck supple. No JVD present. No thyromegaly present. + carotid bruit on right  Cardiovascular: Normal rate, regular rhythm, normal heart sounds, no murmur, no gallop and no friction rub.    Pulmonary/Chest: Breath sounds normal. No respiratory distress.  no wheezes.  no rales.   Abdominal: Soft. Bowel sounds are normal.  no distension. There is no tenderness.   Musculoskeletal: moves spont with equal strength   Neurological:  alert and oriented to person, place, and time. speech clear and approp, no facial drooping  Skin: Skin is warm and dry. No rash noted. No erythema. No pallor.   Extremities: no edema   Vasc; pulses palpable  Psychiatric: normal mood and affect.     Monitor:  SR       Results Review: see below       Recent Results (from the past 48 hour(s))   ECG 12 Lead Chest Pain    Collection Time: 03/10/23  9:26 AM   Result Value Ref Range    QT Interval 463 ms   Green Top (Gel)    Collection Time: 03/10/23  9:31 AM   Result Value Ref Range    Extra Tube Hold for add-ons.    Lavender Top     Collection Time: 03/10/23  9:31 AM   Result Value Ref Range    Extra Tube hold for add-on    Gold Top - SST    Collection Time: 03/10/23  9:31 AM   Result Value Ref Range    Extra Tube Hold for add-ons.    Light Blue Top    Collection Time: 03/10/23  9:31 AM   Result Value Ref Range    Extra Tube Hold for add-ons.    Comprehensive Metabolic Panel    Collection Time: 03/10/23  9:31 AM    Specimen: Blood   Result Value Ref Range    Glucose 153 (H) 65 - 99 mg/dL    BUN 18 8 - 23 mg/dL    Creatinine 0.91 0.57 - 1.00 mg/dL    Sodium 141 136 - 145 mmol/L    Potassium 3.5 3.5 - 5.2 mmol/L    Chloride 104 98 - 107 mmol/L    CO2 26.6 22.0 - 29.0 mmol/L    Calcium 9.7 8.6 - 10.5 mg/dL    Total Protein 7.1 6.0 - 8.5 g/dL    Albumin 4.5 3.5 - 5.2 g/dL    ALT (SGPT) 10 1 - 33 U/L    AST (SGOT) 17 1 - 32 U/L    Alkaline Phosphatase 64 39 - 117 U/L    Total Bilirubin 0.2 0.0 - 1.2 mg/dL    Globulin 2.6 gm/dL    A/G Ratio 1.7 g/dL    BUN/Creatinine Ratio 19.8 7.0 - 25.0    Anion Gap 10.4 5.0 - 15.0 mmol/L    eGFR 68.9 >60.0 mL/min/1.73   Protime-INR    Collection Time: 03/10/23  9:31 AM    Specimen: Blood   Result Value Ref Range    Protime 12.3 11.7 - 14.2 Seconds    INR 0.91 0.90 - 1.10   Lipase    Collection Time: 03/10/23  9:31 AM    Specimen: Blood   Result Value Ref Range    Lipase 40 13 - 60 U/L   High Sensitivity Troponin T    Collection Time: 03/10/23  9:31 AM    Specimen: Blood   Result Value Ref Range    HS Troponin T 11 (H) <10 ng/L   CBC Auto Differential    Collection Time: 03/10/23  9:31 AM    Specimen: Blood   Result Value Ref Range    WBC 9.92 3.40 - 10.80 10*3/mm3    RBC 3.79 3.77 - 5.28 10*6/mm3    Hemoglobin 11.1 (L) 12.0 - 15.9 g/dL    Hematocrit 33.7 (L) 34.0 - 46.6 %    MCV 88.9 79.0 - 97.0 fL    MCH 29.3 26.6 - 33.0 pg    MCHC 32.9 31.5 - 35.7 g/dL    RDW 14.9 12.3 - 15.4 %    RDW-SD 47.8 37.0 - 54.0 fl    MPV 9.9 6.0 - 12.0 fL    Platelets 420 140 - 450 10*3/mm3    Neutrophil % 55.5 42.7 - 76.0 %     Lymphocyte % 33.4 19.6 - 45.3 %    Monocyte % 9.1 5.0 - 12.0 %    Eosinophil % 1.2 0.3 - 6.2 %    Basophil % 0.6 0.0 - 1.5 %    Immature Grans % 0.2 0.0 - 0.5 %    Neutrophils, Absolute 5.51 1.70 - 7.00 10*3/mm3    Lymphocytes, Absolute 3.31 (H) 0.70 - 3.10 10*3/mm3    Monocytes, Absolute 0.90 0.10 - 0.90 10*3/mm3    Eosinophils, Absolute 0.12 0.00 - 0.40 10*3/mm3    Basophils, Absolute 0.06 0.00 - 0.20 10*3/mm3    Immature Grans, Absolute 0.02 0.00 - 0.05 10*3/mm3    nRBC 0.0 0.0 - 0.2 /100 WBC   High Sensitivity Troponin T 2Hr    Collection Time: 03/10/23 11:30 AM    Specimen: Blood   Result Value Ref Range    HS Troponin T 11 (H) <10 ng/L    Troponin T Delta 0 >=-4 - <+4 ng/L   ]  No results found for: PTT  No components found for: PT/INR      Assessment & Plan       Chest pain, unspecified type  1. Chest pain/unstable angina  2. Troponin elevation- likely NSTEMI   HS trop: 11-->11  3. HTN  4. HLD  5. Carotid Stenosis   - JAYLENE 50-69%, LICA < 50%- not significant enough for intervention      -  patient to repeat duplex in September 2023  6. VHD   a. Mild-moderate mitral valve regurgitation    Plan:  Patient with chest pain- HS trop 11-->11  EKG without acute ST-T wave changes  Given anginal symptoms relieved with SL nitro, cardiac risk factors, patient would benefit from LHC.  Discussed procedure with patient, explained risks vs benefits, including but not excluding bleeding, MI, stroke and death. Patient understands and is agreeable to proceed.  Discussed with Dr. Cochran who will arrange LHC with cath lab.   Obtain echocardiogram.  MD to follow             Dannielle Hernandez, APRN  03/10/23  12:05 EST        Discussed with patient   Time: More than 50% of time spent in counseling and coordination of care:  Total face-to-face/floor time 30 min.  Time spent in counseling 15 min. Counseling included the following topics: smoking cessation       Patient seen and examined , agree with Ms Withrows assessment and Plan :    67 yr old AAF with multiple risk factors ( HTN, HLD. PAD)present with anginal discomfort. Low grade troponin.   ECG stable, nonspecific changes.   Ongoing discomfort, will proceed with definitve testing with cardiac cath.     Ruth Cochran MD

## 2023-03-10 NOTE — DISCHARGE SUMMARY
Date of Discharge:  3/10/2023    Discharge Diagnosis: Chest pain     Presenting Problem/History of Present Illness  Hypercholesterolemia [E78.00]  Family history of coronary artery disease [Z82.49]  Chest pain, unspecified type [R07.9]  Hypertension, unspecified type [I10]     Patient Active Problem List   Diagnosis   • Chest pain, unspecified type   • Carotid bruit   • Dyslipidemia   • Hypercholesterolemia   • Hypertension   • Hypothyroidism   • Lumbar spondylosis   • Mitral valve regurgitation   • Osteoporosis   • Postablative hypothyroidism   • History of Graves' disease   • Elevated hemoglobin A1c        Hospital Course  Patient is a 67 y.o. female presented with PMH of HTN, HLD, PAD admitted with chest pain concerning for angina. Nonspecific ECG changes. Low grade flat nonspecific troponin.   Ongoing discomfort with multiple risk factors, plan for cardiac cath.   Cardiac cath with mild nonobstructive CAD.   LVEF normal.   Resume home meds.   Stable for d/c with outpt follow up with myself in 4-6 weeks and PCP in 1-2 weeks   Cardiac cath summary listed below.     Procedures Performed  Procedure(s):  LEFT HEART CATH  Coronary angiography  Left ventriculography  03/10 5898 Note By: Ruth Cochran MD    Results for orders placed during the hospital encounter of 03/10/23    Cardiac Catheterization/Vascular Study    Narrative  :  Ruth Cochran MD    Procedures performed:  1.  Left heart catheterization.  2.  Left ventriculography  3.  Selective native coronary angiography    Indications:  ACS    Description of the procedure:  Patient was brought to the cardiac catheter was explained the risk and benefit and alternatives of the procedure. Patient signed informed consent, was prepped and draped in sterile fashion for right radial artery access.  Using micropuncture needle and modified Seldinger technique a 6 Wolof sheath was advanced into the right radial artery.  5 Fr JL3.5 and JR4 catheter was used to  engage the left and right coronary artery respectively.  A pigtail catheter was used to cross the aortic valve and perform a left heart catheterization and LV gram.  Moderate sedation duration 15 minutes.    All exchanges were done over the wire.    Hemostasis was achieved by manual compression / TR band.    No complications noted.    Findings:  1.  Left heart catheterization: LVEDP 6  mmHg . No gradient across AV valve    2.  Left ventriculography:  LV function normal, 55-60%.    3.  Selective native coronary angiography:  A. Left main bifurcates into LAD and left circumflex.  No angiographic disease noted in left main.  B. LAD: Large vessel wraps around the apex.  Gives rise to small to medium caliber diagonal branches.  Ostial LAD with 20% stenosis.  Mid LAD with 20% stenosis.  C. Left circumflex: Large caliber vessel.  No angiographic disease noted in circumflex and its branches.  D. Right coronary artery: Large-caliber dominant vessel.  Distal right coronary artery with 30% stenosis.      Conclusion:  1.  Mild nonobstructive coronary artery disease.  2.  Preserved LVEF and LVEDP.      Recommendations:  Aggressive risk factor modification.  Medical management.    Ruth Cochran M.D      Consults:   Consults     Date and Time Order Name Status Description    3/10/2023 10:53 AM Cardiology (on-call MD unless specified)              Ejection Fraction  55-60% on LV gram  Condition on Discharge:  Stable.     Physical Exam at Discharge:  Gen : Normal.   Heart : S1 s2 , systolic murmur   Chest : Clear NVBS b/l   Ext : No edema,   Neuro: alert and oriented.     Vital Signs  Temp:  [97.6 °F (36.4 °C)-97.9 °F (36.6 °C)] 97.9 °F (36.6 °C)  Heart Rate:  [53-71] 58  Resp:  [12-18] 18  BP: (131-194)/(77-93) 194/93  Wt Readings from Last 4 Encounters:   03/10/23 69.9 kg (154 lb 1.6 oz)   09/24/19 69.9 kg (154 lb)         Discharge Disposition  Home or Self Care    Discharge Medications     Discharge Medications      Continue  These Medications      Instructions Start Date   amLODIPine 5 MG tablet  Commonly known as: NORVASC   5 mg, Oral, Daily      atorvastatin 20 MG tablet  Commonly known as: LIPITOR   20 mg, Oral, Daily      cyclobenzaprine 10 MG tablet  Commonly known as: FLEXERIL   10 mg, Oral, 3 Times Daily PRN      gabapentin 300 MG capsule  Commonly known as: NEURONTIN   1 capsule, Oral, 3 Times Daily PRN      levothyroxine 75 MCG tablet  Commonly known as: SYNTHROID, LEVOTHROID   75 mcg, Oral, Daily      losartan 100 MG tablet  Commonly known as: COZAAR   100 mg, Oral, Daily      meloxicam 15 MG tablet  Commonly known as: MOBIC   15 mg, Oral, Daily      nitroglycerin 0.4 MG SL tablet  Commonly known as: NITROSTAT   0.4 mg, Sublingual, Every 5 Minutes PRN      triamterene-hydrochlorothiazide 75-50 MG per tablet  Commonly known as: MAXZIDE   1 tablet, Oral, Daily             Discharge Diet: Cardiac/ carb consistent.     Activity at Discharge:   Not restricted, except wrist /right side , do not life > 5 pounds for 2 days ( right hand)    Follow-up Appointments  Myself at 4-6 weeks       Test Results at Discharge  Lab Results   Component Value Date    GLUCOSE 153 (H) 03/10/2023    CALCIUM 9.7 03/10/2023     03/10/2023    K 3.5 03/10/2023    CO2 26.6 03/10/2023     03/10/2023    BUN 18 03/10/2023    CREATININE 0.91 03/10/2023    EGFRIFAFRI 71 09/24/2019    BCR 19.8 03/10/2023    ANIONGAP 10.4 03/10/2023        Lab Results   Component Value Date    GLUCOSE 153 (H) 03/10/2023    BUN 18 03/10/2023    CREATININE 0.91 03/10/2023    EGFRIFAFRI 71 09/24/2019    BCR 19.8 03/10/2023    CO2 26.6 03/10/2023    CALCIUM 9.7 03/10/2023    ALBUMIN 4.5 03/10/2023    AST 17 03/10/2023    ALT 10 03/10/2023        HS Troponin T   Date Value Ref Range Status   03/10/2023 11 (H) <10 ng/L Final   03/10/2023 11 (H) <10 ng/L Final       Lab Results   Component Value Date    WBC 9.92 03/10/2023    HGB 11.1 (L) 03/10/2023    HCT 33.7 (L) 03/10/2023     MCV 88.9 03/10/2023     03/10/2023      Lab Results   Component Value Date    CHOL 160 03/10/2023     Lab Results   Component Value Date    HDL 49 03/10/2023     Lab Results   Component Value Date    LDL 95 03/10/2023     Lab Results   Component Value Date    TRIG 88 03/10/2023     No components found for: CHOLHDL   No results found for: HGBA1C   No results found for: TSH        Ruth Cochran MD  03/10/23  15:29 EST    Time: > 30 minutes.

## 2023-03-10 NOTE — PROGRESS NOTES
Clinical Pharmacy Services: Medication History    Deborah Bryan is a 67 y.o. female presenting to [facility] for   Chief Complaint   Patient presents with   • Chest Pain       She  has a past medical history of Carotid stenosis, asymptomatic (11/2022), Disease of thyroid gland, Hyperlipidemia, Hypertension, and Mitral valve regurgitation.    Allergies as of 03/10/2023   • (No Known Allergies)       Medication information was obtained from: Patient   Pharmacy and Phone Number:     Prior to Admission Medications     Prescriptions Last Dose Informant Patient Reported? Taking?    amLODIPine (NORVASC) 5 MG tablet Past Week Self Yes Yes    Take 1 tablet by mouth Daily.    atorvastatin (LIPITOR) 20 MG tablet Past Week Self Yes Yes    Take 1 tablet by mouth Daily.    cyclobenzaprine (FLEXERIL) 10 MG tablet Past Week Self Yes Yes    Take 1 tablet by mouth 3 (Three) Times a Day As Needed.    gabapentin (NEURONTIN) 300 MG capsule Past Week Self Yes Yes    Take 1 capsule by mouth 3 (Three) Times a Day As Needed.    levothyroxine (SYNTHROID, LEVOTHROID) 75 MCG tablet 3/9/2023 Self Yes Yes    Take 1 tablet by mouth Daily.    losartan (COZAAR) 100 MG tablet Past Week Self Yes Yes    Take 1 tablet by mouth Daily.    meloxicam (MOBIC) 15 MG tablet Past Week Self Yes Yes    Take 1 tablet by mouth Daily.    nitroglycerin (NITROSTAT) 0.4 MG SL tablet 3/9/2023 Self Yes Yes    Place 1 tablet under the tongue Every 5 (Five) Minutes As Needed.    triamterene-hydrochlorothiazide (MAXZIDE) 75-50 MG per tablet Past Week Self Yes Yes    Take 1 tablet by mouth Daily.            Medication notes:     This medication list is complete to the best of my knowledge as of 3/10/2023    Please call if questions.    Harvinder Treviño  Medication History Technician  [phone]    3/10/2023 12:08 EST

## 2023-03-10 NOTE — ED PROVIDER NOTES
" EMERGENCY DEPARTMENT ENCOUNTER    Room Number:  N438/1  Date seen:  3/10/2023  PCP: Provider, No Known  Historian: Patient      HPI:  Chief Complaint: Chest pain    Context: Deborah Bryan is a 67 y.o. female who presents to the ED c/o chest pain.  Patient has a history of hypertension and hyperlipidemia.  She states she has been followed by Dr. Patel from [facility] and has had a stress test in the past.  She does not believe she had a cardiac cath.  She has had intermittent episodes of pain like this before and thus has nitroglycerin.  She states she has had 3 episodes of substernal chest heaviness that radiates to her throat with associated diaphoresis and nausea but no shortness of breath.  She states each episode has been stopped by the nitroglycerin.  Currently she is pain-free.  Patient does have high blood pressure and high cholesterol and states she is a \"prediabetic\".  She states her father had heart disease in his 50s.  She is a non-smoker.      PAST MEDICAL HISTORY  Active Ambulatory Problems     Diagnosis Date Noted   • Carotid bruit 09/09/2022   • Dyslipidemia 03/23/2015   • Hypercholesterolemia 03/10/2023   • Hypertension 03/23/2015   • Hypothyroidism 03/10/2023   • Lumbar spondylosis 01/30/2019   • Mitral valve regurgitation 01/17/2017   • Osteoporosis 02/17/2021   • Postablative hypothyroidism 03/23/2015   • History of Graves' disease 02/17/2021   • Elevated hemoglobin A1c 02/17/2021     Resolved Ambulatory Problems     Diagnosis Date Noted   • No Resolved Ambulatory Problems     Past Medical History:   Diagnosis Date   • Carotid stenosis, asymptomatic 11/2022   • Disease of thyroid gland    • Hyperlipidemia          REVIEW OF SYSTEMS  All systems reviewed and negative except for those discussed in HPI.       PAST SURGICAL HISTORY  History reviewed. No pertinent surgical history.      FAMILY HISTORY  Family History   Problem Relation Age of Onset   • Coronary artery disease " Father          SOCIAL HISTORY  Social History     Socioeconomic History   • Marital status: Single   Tobacco Use   • Smoking status: Former     Types: Cigarettes   • Smokeless tobacco: Never   Vaping Use   • Vaping Use: Never used   Substance and Sexual Activity   • Alcohol use: Never   • Drug use: Yes     Types: Marijuana   • Sexual activity: Defer         ALLERGIES  Patient has no known allergies.      PHYSICAL EXAM  ED Triage Vitals [03/10/23 0916]   Temp Heart Rate Resp BP SpO2   97.6 °F (36.4 °C) 71 16 -- 99 %      Temp src Heart Rate Source Patient Position BP Location FiO2 (%)   Tympanic Monitor -- -- --       Physical Exam      GENERAL: 67-year-old female in no acute distress  HENT: NCAT: nares patent: Neck supple  EYES: no scleral icterus  CV: regular rhythm, normal rate  RESPIRATORY: normal effort  ABDOMEN: soft, NTND: Bowel sounds positive  MUSCULOSKELETAL: no deformity  NEURO: alert with nonfocal neuro exam  PSYCH:  calm, cooperative  SKIN: warm, dry    Vital signs and nursing notes reviewed.    PPE pt does not present with symptoms for COVID19; however, I was wearing a mask and goggles throughout all patient interaction.    LAB RESULTS  Recent Results (from the past 24 hour(s))   ECG 12 Lead Chest Pain    Collection Time: 03/10/23  9:26 AM   Result Value Ref Range    QT Interval 463 ms   Green Top (Gel)    Collection Time: 03/10/23  9:31 AM   Result Value Ref Range    Extra Tube Hold for add-ons.    Lavender Top    Collection Time: 03/10/23  9:31 AM   Result Value Ref Range    Extra Tube hold for add-on    Gold Top - SST    Collection Time: 03/10/23  9:31 AM   Result Value Ref Range    Extra Tube Hold for add-ons.    Light Blue Top    Collection Time: 03/10/23  9:31 AM   Result Value Ref Range    Extra Tube Hold for add-ons.    Comprehensive Metabolic Panel    Collection Time: 03/10/23  9:31 AM    Specimen: Blood   Result Value Ref Range    Glucose 153 (H) 65 - 99 mg/dL    BUN 18 8 - 23 mg/dL     Creatinine 0.91 0.57 - 1.00 mg/dL    Sodium 141 136 - 145 mmol/L    Potassium 3.5 3.5 - 5.2 mmol/L    Chloride 104 98 - 107 mmol/L    CO2 26.6 22.0 - 29.0 mmol/L    Calcium 9.7 8.6 - 10.5 mg/dL    Total Protein 7.1 6.0 - 8.5 g/dL    Albumin 4.5 3.5 - 5.2 g/dL    ALT (SGPT) 10 1 - 33 U/L    AST (SGOT) 17 1 - 32 U/L    Alkaline Phosphatase 64 39 - 117 U/L    Total Bilirubin 0.2 0.0 - 1.2 mg/dL    Globulin 2.6 gm/dL    A/G Ratio 1.7 g/dL    BUN/Creatinine Ratio 19.8 7.0 - 25.0    Anion Gap 10.4 5.0 - 15.0 mmol/L    eGFR 68.9 >60.0 mL/min/1.73   Protime-INR    Collection Time: 03/10/23  9:31 AM    Specimen: Blood   Result Value Ref Range    Protime 12.3 11.7 - 14.2 Seconds    INR 0.91 0.90 - 1.10   Lipase    Collection Time: 03/10/23  9:31 AM    Specimen: Blood   Result Value Ref Range    Lipase 40 13 - 60 U/L   High Sensitivity Troponin T    Collection Time: 03/10/23  9:31 AM    Specimen: Blood   Result Value Ref Range    HS Troponin T 11 (H) <10 ng/L   CBC Auto Differential    Collection Time: 03/10/23  9:31 AM    Specimen: Blood   Result Value Ref Range    WBC 9.92 3.40 - 10.80 10*3/mm3    RBC 3.79 3.77 - 5.28 10*6/mm3    Hemoglobin 11.1 (L) 12.0 - 15.9 g/dL    Hematocrit 33.7 (L) 34.0 - 46.6 %    MCV 88.9 79.0 - 97.0 fL    MCH 29.3 26.6 - 33.0 pg    MCHC 32.9 31.5 - 35.7 g/dL    RDW 14.9 12.3 - 15.4 %    RDW-SD 47.8 37.0 - 54.0 fl    MPV 9.9 6.0 - 12.0 fL    Platelets 420 140 - 450 10*3/mm3    Neutrophil % 55.5 42.7 - 76.0 %    Lymphocyte % 33.4 19.6 - 45.3 %    Monocyte % 9.1 5.0 - 12.0 %    Eosinophil % 1.2 0.3 - 6.2 %    Basophil % 0.6 0.0 - 1.5 %    Immature Grans % 0.2 0.0 - 0.5 %    Neutrophils, Absolute 5.51 1.70 - 7.00 10*3/mm3    Lymphocytes, Absolute 3.31 (H) 0.70 - 3.10 10*3/mm3    Monocytes, Absolute 0.90 0.10 - 0.90 10*3/mm3    Eosinophils, Absolute 0.12 0.00 - 0.40 10*3/mm3    Basophils, Absolute 0.06 0.00 - 0.20 10*3/mm3    Immature Grans, Absolute 0.02 0.00 - 0.05 10*3/mm3    nRBC 0.0 0.0 - 0.2 /100  WBC   High Sensitivity Troponin T 2Hr    Collection Time: 03/10/23 11:30 AM    Specimen: Blood   Result Value Ref Range    HS Troponin T 11 (H) <10 ng/L    Troponin T Delta 0 >=-4 - <+4 ng/L       Ordered the above labs and reviewed the results.        RADIOLOGY  XR Chest 1 View    Result Date: 3/10/2023  ONE VIEW PORTABLE CHEST  HISTORY: Chest pain.  FINDINGS: The lungs are well-expanded and clear. There is mild cardiomegaly unchanged from 09/24/2019. No acute abnormality is seen.  This report was finalized on 3/10/2023 10:19 AM by Dr. Kurt Haynes M.D.        Ordered the above noted radiological studies. Reviewed by me in PACS.            PROCEDURES  Procedures          MEDICATIONS GIVEN IN ER  Medications   sodium chloride 0.9 % flush 10 mL (has no administration in time range)   ondansetron (ZOFRAN) injection 4 mg (has no administration in time range)   nitroglycerin (NITROSTAT) ointment 1 inch (1 inch Topical Given 3/10/23 0942)   aspirin tablet 325 mg (325 mg Oral Given 3/10/23 0943)   aluminum-magnesium hydroxide-simethicone (MAALOX MAX) 400-400-40 MG/5ML suspension 15 mL (15 mL Oral Given 3/10/23 1120)   Lidocaine Viscous HCl (XYLOCAINE) 2 % solution 15 mL (15 mL Mouth/Throat Given 3/10/23 1120)             MEDICAL DECISION MAKING, PROGRESS, and CONSULTS    All labs have been independently reviewed by me.  All radiology studies have been reviewed by me and I have also reviewed the radiology report.   EKG's independently viewed and interpreted by me.  Discussion below represents my analysis of pertinent findings related to patient's condition, differential diagnosis, treatment plan and final disposition.      Additional sources:      - External (non-ED) record review: I reviewed the patient's last visit with her cardiologist-Dr. Patel who works for [facility] cardiology.  She was seen by him in September 2022 and has a history of mild to moderate mitral regurgitation.  He states that she has  had chest pain since an accident in 2018        - Shared decision making: After shared decision-making discussion with myself and the patient we agree she needs admitted to the hospital for cardiology consultation.      Orders placed during this visit:  Orders Placed This Encounter   Procedures   • XR Chest 1 View   • [place] Draw   • Comprehensive Metabolic Panel   • Protime-INR   • Lipase   • High Sensitivity Troponin T   • CBC Auto Differential   • High Sensitivity Troponin T 2Hr   • Lipid Panel   • NPO Diet NPO Type: Strict NPO   • Monitor Blood Pressure   • Cardiac Monitoring   • Pulse Oximetry, Continuous   • Obtain Informed Consent   • Clip Bilateral Groins   • Record Actual Height & Weight Prior to Procedure   • Hold Metformin (including any combination product containing Metformin) 48 Hours Before Scheduled Cardiac Procedure   • Please Call and Notify Pharmacy of Order to Hold Metformin (including any combination product containing Metformin) 48 Hours Before Scheduled Cardiac Procedure   • Verify On Day of Procedure: No Metformin (including any combination product containing Metformin) 48 Hours Before Scheduled Cardiac Procedure   • Notify MD If Patient is Taking Any of the Listed Medications or If They Have A Contrast Allergy   • Nurse to Order 0.9%NaCl Infusion @ 3mL/kg/hr on Day of Surgery 1 Hour Prior to Scheduled Procedure Time   • Code Status and Medical Interventions:   • Cardiology (on-call MD unless specified)   • Oxygen Therapy- Nasal Cannula; 2 LPM; Titrate for SPO2: 90%   • ECG 12 Lead Chest Pain   • Insert Peripheral IV   • Initiate Observation Status   • Transfer Patient   • Green Top (Gel)   • Lavender Top   • Gold Top - SST   • Light Blue Top   • CBC & Differential         Differential diagnosis:  My differential diagnosis includes but is not limited to myocardial infarction, acute coronary syndrome, pericarditis, chest wall pain, pneumonia, pulmonary embolism, pneumothorax, or esophageal  spasm.      Independent interpretation of labs, radiology studies, and discussions with consultants:  ED Course as of 03/10/23 1256   Fri Mar 10, 2023   0950 EKG    EKG time: 926  Rhythm/Rate: Normal sinus rhythm at 70  Poor R wave progression  No Acute Ischemia  Non-Specific ST-T changes    Similar compared to prior on 9/24/2019    Interpreted Contemporaneously by me.  Independently viewed by me     [GP]   0936 The patient's EKGs has no changes from prior but shows a questionable old anteroseptal infarct.  I will treat the patient with aspirin and Nitropaste while obtaining labs and chest x-ray for further evaluation. [GP]   1047 My independent interpretation of the patient's chest x-ray is no pneumonia, no pneumothorax and no acute disease [GP]   1047 Patient's initial troponin is minimally elevated at 11. [GP]   1100 On repeat examination the patient is resting comfortably.  She does report some minimal chest discomfort but now states that her stomach is hurting.  I will give her a GI cocktail while consulting cardiology. [GP]   1120 I discussed the case with cardiology who will admit the patient in observation status to Dr. Kapadia for possible cardiac catheterization. [GP]   1127 Patient states she has had some relief with a GI cocktail. [GP]      ED Course User Index  [GP] Brock Kc MD               DIAGNOSIS  Final diagnoses:   Chest pain, unspecified type   Hypertension, unspecified type   Hypercholesterolemia   Family history of coronary artery disease         DISPOSITION  ADMISSION    Discussed treatment plan and reason for admission with pt/family and admitting physician.  Pt/family voiced understanding of the plan for admission for further testing/treatment as needed.            Latest Documented Vital Signs:  As of 12:56 EST  BP- (!) 194/93 HR- 58 Temp- 97.9 °F (36.6 °C) (Oral) O2 sat- 95%--      --------------------  Please note that portions of this were completed with a voice recognition  program.       Note Disclaimer: At [facility], we believe that sharing information builds trust and better relationships. You are receiving this note because you are receiving care at [facility] or recently visited. It is possible you will see health information before a provider has talked with you about it. This kind of information can be easy to misunderstand. To help you fully understand what it means for your health, we urge you to discuss this note with your provider.           Brock Kc MD  03/10/23 1257

## (undated) DEVICE — GLIDESHEATH SLENDER STAINLESS STEEL KIT: Brand: GLIDESHEATH SLENDER

## (undated) DEVICE — CATH VENT MIV RADL PIG ST TIP 5F 110CM

## (undated) DEVICE — CATH DIAG IMPULSE FR4 5F 100CM

## (undated) DEVICE — GW EMR FIX EXCHG J STD .035 3MM 260CM

## (undated) DEVICE — KT MANIFLD CARDIAC

## (undated) DEVICE — CATH DIAG IMPULSE FL3.5 5F 100CM

## (undated) DEVICE — PK CATH CARD 40